# Patient Record
Sex: FEMALE | Race: OTHER | NOT HISPANIC OR LATINO | URBAN - METROPOLITAN AREA
[De-identification: names, ages, dates, MRNs, and addresses within clinical notes are randomized per-mention and may not be internally consistent; named-entity substitution may affect disease eponyms.]

---

## 2020-07-25 ENCOUNTER — TELEPHONE ENCOUNTER (OUTPATIENT)
Dept: URBAN - METROPOLITAN AREA CLINIC 13 | Facility: CLINIC | Age: 25
End: 2020-07-25

## 2020-07-26 ENCOUNTER — TELEPHONE ENCOUNTER (OUTPATIENT)
Dept: URBAN - METROPOLITAN AREA CLINIC 13 | Facility: CLINIC | Age: 25
End: 2020-07-26

## 2021-08-27 ENCOUNTER — OFFICE VISIT (OUTPATIENT)
Dept: URBAN - METROPOLITAN AREA TELEHEALTH 2 | Facility: TELEHEALTH | Age: 26
End: 2021-08-27
Payer: COMMERCIAL

## 2021-08-27 ENCOUNTER — LAB OUTSIDE AN ENCOUNTER (OUTPATIENT)
Dept: URBAN - METROPOLITAN AREA CLINIC 113 | Facility: CLINIC | Age: 26
End: 2021-08-27

## 2021-08-27 ENCOUNTER — DASHBOARD ENCOUNTERS (OUTPATIENT)
Age: 26
End: 2021-08-27

## 2021-08-27 VITALS — BODY MASS INDEX: 21.68 KG/M2 | HEIGHT: 64 IN | WEIGHT: 127 LBS

## 2021-08-27 DIAGNOSIS — K21.9 GERD WITHOUT ESOPHAGITIS: ICD-10-CM

## 2021-08-27 DIAGNOSIS — K31.84 GASTROPARESIS: ICD-10-CM

## 2021-08-27 DIAGNOSIS — R11.14 BILIOUS VOMITING WITH NAUSEA: ICD-10-CM

## 2021-08-27 DIAGNOSIS — R10.13 EPIGASTRIC ABDOMINAL PAIN: ICD-10-CM

## 2021-08-27 PROBLEM — 368581000119106: Status: ACTIVE | Noted: 2021-08-27

## 2021-08-27 PROBLEM — 302870006: Status: ACTIVE | Noted: 2021-08-27

## 2021-08-27 PROBLEM — 300331000: Status: ACTIVE | Noted: 2021-08-27

## 2021-08-27 PROBLEM — 428882003: Status: ACTIVE | Noted: 2021-08-27

## 2021-08-27 PROBLEM — 235675006: Status: ACTIVE | Noted: 2021-08-27

## 2021-08-27 PROCEDURE — 99245 OFF/OP CONSLTJ NEW/EST HI 55: CPT | Performed by: INTERNAL MEDICINE

## 2021-08-27 PROCEDURE — 99205 OFFICE O/P NEW HI 60 MIN: CPT | Performed by: INTERNAL MEDICINE

## 2021-08-27 RX ORDER — NIFEDIPINE 30 MG/1
1 TABLET TABLET, EXTENDED RELEASE ORAL ONCE A DAY
Status: ACTIVE | COMMUNITY

## 2021-08-27 RX ORDER — INSULIN GLARGINE 100 [IU]/ML
45 UNITS INJECTION, SOLUTION SUBCUTANEOUS NIGHTLY
Status: ACTIVE | COMMUNITY

## 2021-08-27 RX ORDER — LABETALOL HYDROCHLORIDE 200 MG/1
1 TABLET TABLET ORAL TWICE A DAY
Status: ACTIVE | COMMUNITY

## 2021-08-27 RX ORDER — FLUCONAZOLE 150 MG/1
1 TABLET TABLET ORAL ONCE DAILY
Status: ACTIVE | COMMUNITY

## 2021-08-27 RX ORDER — FAMOTIDINE 20 MG/1
1 TABLET AT BEDTIME TABLET, FILM COATED ORAL TWICE A DAY
Status: ACTIVE | COMMUNITY

## 2021-08-27 RX ORDER — LANSOPRAZOLE 30 MG/1
1 TABLET TABLET, ORALLY DISINTEGRATING, DELAYED RELEASE ORAL ONCE A DAY
Qty: 90 | Refills: 3 | OUTPATIENT

## 2021-08-27 RX ORDER — PROMETHAZINE HYDROCHLORIDE 6.25 MG/5ML
10 ML AS NEEDED SYRUP ORAL
Status: ACTIVE | COMMUNITY

## 2021-08-27 RX ORDER — CYCLOBENZAPRINE HYDROCHLORIDE 10 MG/1
1 TABLET AT BEDTIME AS NEEDED TABLET, FILM COATED ORAL ONCE A DAY
Status: ACTIVE | COMMUNITY

## 2021-08-27 NOTE — HPI-TODAY'S VISIT:
This is a 26-year-old female referred by Dr. Italo Lutz, presenting for evaluation of upper abdominal pain. A copy of today's visit will be forwarded to the referring provider.  Labs 7/14/2021:WBC 6.9, hemoglobin 12.7, hematocrit 41.4, MCV 91, platelets 238, glucose 740, BUN 16, creatinine 0.78, sodium 125, potassium 4.7, T bili<0.2, , AST 24, ALT 26, cholesterol 190, triglycerides 781, HDL 25, , hemoglobin A1c > 15.5.  She had her first episode of pancreatitis at age 15. She was having frequent hospitalizations for pancreatitis, which continued until she had her gall bladder removed at age 17. She has frequent reflux, nausea and vomiting. Her PCP has given her promethazine as well as a pain medication. She tells me that she had a gastric emptying test at [facility]. She was told she had delayed gastric emptying. She complains of chronic epigastric burning pain. She is taking famotidine 20 mg, two tablets once daily. Emesis is a mixture of bilious fluid and food contents. No bloody emesis or coffee ground looking emesis. Vomiting does not alleviate her upper abdominal pain. She has bowel movements daily without blood per rectum or melena.  She tells me that she has resumed Dexcom for glucose management, and blood glucose is running in the 250s. She is home today waiting for her Omnipod to arrive.  Gastric emptying 8/23/21: delayed emptying  CT a/p without contrast May 2021. hypodensity of the liver, poorly defined. MRI recommended to better characterize.

## 2021-09-01 PROBLEM — 266435005: Status: ACTIVE | Noted: 2021-08-27

## 2021-09-09 ENCOUNTER — OFFICE VISIT (OUTPATIENT)
Dept: URBAN - METROPOLITAN AREA SURGERY CENTER 25 | Facility: SURGERY CENTER | Age: 26
End: 2021-09-09

## 2021-10-04 ENCOUNTER — OFFICE VISIT (OUTPATIENT)
Dept: URBAN - METROPOLITAN AREA SURGERY CENTER 25 | Facility: SURGERY CENTER | Age: 26
End: 2021-10-04

## 2021-10-04 ENCOUNTER — TELEPHONE ENCOUNTER (OUTPATIENT)
Dept: URBAN - METROPOLITAN AREA CLINIC 112 | Facility: CLINIC | Age: 26
End: 2021-10-04

## 2021-10-04 RX ORDER — FLUCONAZOLE 150 MG/1
1 TABLET TABLET ORAL ONCE DAILY
Status: ACTIVE | COMMUNITY

## 2021-10-04 RX ORDER — INSULIN GLARGINE 100 [IU]/ML
45 UNITS INJECTION, SOLUTION SUBCUTANEOUS NIGHTLY
Status: ACTIVE | COMMUNITY

## 2021-10-04 RX ORDER — LABETALOL HYDROCHLORIDE 200 MG/1
1 TABLET TABLET ORAL TWICE A DAY
Status: ACTIVE | COMMUNITY

## 2021-10-04 RX ORDER — LANSOPRAZOLE 30 MG/1
1 TABLET TABLET, ORALLY DISINTEGRATING, DELAYED RELEASE ORAL ONCE A DAY
Qty: 90 | Refills: 3 | Status: ACTIVE | COMMUNITY

## 2021-10-04 RX ORDER — PROMETHAZINE HYDROCHLORIDE 6.25 MG/5ML
10 ML AS NEEDED SYRUP ORAL
Status: ACTIVE | COMMUNITY

## 2021-10-04 RX ORDER — CYCLOBENZAPRINE HYDROCHLORIDE 10 MG/1
1 TABLET AT BEDTIME AS NEEDED TABLET, FILM COATED ORAL ONCE A DAY
Status: ACTIVE | COMMUNITY

## 2021-10-04 RX ORDER — NIFEDIPINE 30 MG/1
1 TABLET TABLET, EXTENDED RELEASE ORAL ONCE A DAY
Status: ACTIVE | COMMUNITY

## 2021-10-04 RX ORDER — FAMOTIDINE 20 MG/1
1 TABLET AT BEDTIME TABLET, FILM COATED ORAL TWICE A DAY
Status: ACTIVE | COMMUNITY

## 2021-11-12 ENCOUNTER — OFFICE VISIT (OUTPATIENT)
Dept: URBAN - METROPOLITAN AREA CLINIC 113 | Facility: CLINIC | Age: 26
End: 2021-11-12

## 2021-11-30 ENCOUNTER — OFFICE VISIT (OUTPATIENT)
Dept: URBAN - METROPOLITAN AREA CLINIC 113 | Facility: CLINIC | Age: 26
End: 2021-11-30

## 2021-11-30 RX ORDER — FAMOTIDINE 20 MG/1
1 TABLET AT BEDTIME TABLET, FILM COATED ORAL TWICE A DAY
Status: ACTIVE | COMMUNITY

## 2021-11-30 RX ORDER — NIFEDIPINE 30 MG/1
1 TABLET TABLET, EXTENDED RELEASE ORAL ONCE A DAY
Status: ACTIVE | COMMUNITY

## 2021-11-30 RX ORDER — LABETALOL HYDROCHLORIDE 200 MG/1
1 TABLET TABLET ORAL TWICE A DAY
Status: ACTIVE | COMMUNITY

## 2021-11-30 RX ORDER — INSULIN GLARGINE 100 [IU]/ML
45 UNITS INJECTION, SOLUTION SUBCUTANEOUS NIGHTLY
Status: ACTIVE | COMMUNITY

## 2021-11-30 RX ORDER — PROMETHAZINE HYDROCHLORIDE 6.25 MG/5ML
10 ML AS NEEDED SYRUP ORAL
Status: ACTIVE | COMMUNITY

## 2021-11-30 RX ORDER — LANSOPRAZOLE 30 MG/1
1 TABLET TABLET, ORALLY DISINTEGRATING, DELAYED RELEASE ORAL ONCE A DAY
Qty: 90 | Refills: 3 | Status: ACTIVE | COMMUNITY

## 2021-11-30 RX ORDER — FLUCONAZOLE 150 MG/1
1 TABLET TABLET ORAL ONCE DAILY
Status: ACTIVE | COMMUNITY

## 2021-11-30 RX ORDER — CYCLOBENZAPRINE HYDROCHLORIDE 10 MG/1
1 TABLET AT BEDTIME AS NEEDED TABLET, FILM COATED ORAL ONCE A DAY
Status: ACTIVE | COMMUNITY

## 2022-07-02 ENCOUNTER — CLAIMS CREATED FROM THE CLAIM WINDOW (OUTPATIENT)
Dept: URBAN - METROPOLITAN AREA MEDICAL CENTER 2 | Facility: MEDICAL CENTER | Age: 27
End: 2022-07-02
Payer: COMMERCIAL

## 2022-07-02 DIAGNOSIS — E11.43 TYPE 2 DIABETES MELLITUS WITH DIABETIC AUTONOMIC (POLY)NEUROPATHY: ICD-10-CM

## 2022-07-02 DIAGNOSIS — K76.89 LESION OF LIVER: ICD-10-CM

## 2022-07-02 DIAGNOSIS — R10.13 ABDOMINAL PAIN AND WEIGHT LOSS R/O MESENTERIC ISCHEMIA: ICD-10-CM

## 2022-07-02 DIAGNOSIS — R11.0 AM NAUSEA: ICD-10-CM

## 2022-07-02 DIAGNOSIS — R74.8 ABNORMAL LEVELS OF OTHER SERUM ENZYMES: ICD-10-CM

## 2022-07-02 DIAGNOSIS — R74.01 ABNORMAL/ELEVATED TRANSAMINASE (SGOT, AMINOTRANSFERASE): ICD-10-CM

## 2022-07-02 PROCEDURE — 99254 IP/OBS CNSLTJ NEW/EST MOD 60: CPT | Performed by: INTERNAL MEDICINE

## 2022-07-03 ENCOUNTER — CLAIMS CREATED FROM THE CLAIM WINDOW (OUTPATIENT)
Dept: URBAN - METROPOLITAN AREA MEDICAL CENTER 2 | Facility: MEDICAL CENTER | Age: 27
End: 2022-07-03
Payer: COMMERCIAL

## 2022-07-03 DIAGNOSIS — E11.43 TYPE 2 DIABETES MELLITUS WITH DIABETIC AUTONOMIC (POLY)NEUROPATHY: ICD-10-CM

## 2022-07-03 DIAGNOSIS — R74.01 ABNORMAL/ELEVATED TRANSAMINASE (SGOT, AMINOTRANSFERASE): ICD-10-CM

## 2022-07-03 DIAGNOSIS — R10.13 ABDOMINAL DISCOMFORT, EPIGASTRIC: ICD-10-CM

## 2022-07-03 DIAGNOSIS — K76.89 LESION OF LIVER: ICD-10-CM

## 2022-07-03 DIAGNOSIS — R74.8 ABNORMAL LEVELS OF OTHER SERUM ENZYMES: ICD-10-CM

## 2022-07-03 PROCEDURE — 99232 SBSQ HOSP IP/OBS MODERATE 35: CPT | Performed by: INTERNAL MEDICINE

## 2022-07-04 ENCOUNTER — CLAIMS CREATED FROM THE CLAIM WINDOW (OUTPATIENT)
Dept: URBAN - METROPOLITAN AREA MEDICAL CENTER 2 | Facility: MEDICAL CENTER | Age: 27
End: 2022-07-04
Payer: COMMERCIAL

## 2022-07-04 DIAGNOSIS — R74.01 ABNORMAL/ELEVATED TRANSAMINASE (SGOT, AMINOTRANSFERASE): ICD-10-CM

## 2022-07-04 DIAGNOSIS — R74.8 ABNORMAL LEVELS OF OTHER SERUM ENZYMES: ICD-10-CM

## 2022-07-04 DIAGNOSIS — R93.5 ABDOMINAL ULTRASOUND, ABNORMAL: ICD-10-CM

## 2022-07-04 DIAGNOSIS — E11.43 TYPE 2 DIABETES MELLITUS WITH DIABETIC AUTONOMIC (POLY)NEUROPATHY: ICD-10-CM

## 2022-07-04 DIAGNOSIS — R11.0 AM NAUSEA: ICD-10-CM

## 2022-07-04 PROCEDURE — 99232 SBSQ HOSP IP/OBS MODERATE 35: CPT | Performed by: INTERNAL MEDICINE

## 2022-07-14 PROBLEM — 300331000 LESION OF LIVER: Status: ACTIVE | Noted: 2022-07-14

## 2022-07-20 ENCOUNTER — TELEPHONE ENCOUNTER (OUTPATIENT)
Dept: URBAN - METROPOLITAN AREA CLINIC 113 | Facility: CLINIC | Age: 27
End: 2022-07-20

## 2023-03-03 ENCOUNTER — TELEPHONE ENCOUNTER (OUTPATIENT)
Dept: URBAN - METROPOLITAN AREA CLINIC 113 | Facility: CLINIC | Age: 28
End: 2023-03-03

## 2023-03-08 ENCOUNTER — CLAIMS CREATED FROM THE CLAIM WINDOW (OUTPATIENT)
Dept: URBAN - METROPOLITAN AREA MEDICAL CENTER 2 | Facility: MEDICAL CENTER | Age: 28
End: 2023-03-08
Payer: COMMERCIAL

## 2023-03-08 DIAGNOSIS — K31.84 GASTROPARESIS: ICD-10-CM

## 2023-03-08 DIAGNOSIS — R10.84 ABDOMINAL CRAMPING, GENERALIZED: ICD-10-CM

## 2023-03-08 DIAGNOSIS — K21.9 GASTRO-ESOPHAGEAL REFLUX DISEASE WITHOUT ESOPHAGITIS: ICD-10-CM

## 2023-03-08 DIAGNOSIS — E10.43 GASTROPARESIS DUE TO TYPE 1 DIABETES MELLITUS: ICD-10-CM

## 2023-03-08 DIAGNOSIS — D72.829 LEUKOCYTOSIS: ICD-10-CM

## 2023-03-08 DIAGNOSIS — R74.8 ABNORMAL LEVELS OF OTHER SERUM ENZYMES: ICD-10-CM

## 2023-03-08 DIAGNOSIS — Z87.448 PERSONAL HISTORY OF OTHER DISEASES OF URINARY SYSTEM: ICD-10-CM

## 2023-03-08 DIAGNOSIS — Z34.91 PREGNANT AND NOT YET DELIVERED IN FIRST TRIMESTER: ICD-10-CM

## 2023-03-08 DIAGNOSIS — K76.0 NAFLD (NONALCOHOLIC FATTY LIVER DISEASE): ICD-10-CM

## 2023-03-08 PROCEDURE — 99222 1ST HOSP IP/OBS MODERATE 55: CPT | Performed by: INTERNAL MEDICINE

## 2023-03-08 PROCEDURE — 99222 1ST HOSP IP/OBS MODERATE 55: CPT | Performed by: PHYSICIAN ASSISTANT

## 2023-03-09 ENCOUNTER — CLAIMS CREATED FROM THE CLAIM WINDOW (OUTPATIENT)
Dept: URBAN - METROPOLITAN AREA MEDICAL CENTER 2 | Facility: MEDICAL CENTER | Age: 28
End: 2023-03-09
Payer: COMMERCIAL

## 2023-03-09 DIAGNOSIS — R74.01 ELEVATED AST (SGOT): ICD-10-CM

## 2023-03-09 DIAGNOSIS — E10.43 GASTROPARESIS DUE TO TYPE 1 DIABETES MELLITUS: ICD-10-CM

## 2023-03-09 DIAGNOSIS — Z34.90 INTRAUTERINE PREGNANCY: ICD-10-CM

## 2023-03-09 DIAGNOSIS — R10.814 LLQ ABDOMINAL TENDERNESS: ICD-10-CM

## 2023-03-09 DIAGNOSIS — K76.0 NAFLD (NONALCOHOLIC FATTY LIVER DISEASE): ICD-10-CM

## 2023-03-09 DIAGNOSIS — R74.8 ABNORMAL LEVELS OF OTHER SERUM ENZYMES: ICD-10-CM

## 2023-03-09 PROCEDURE — 99232 SBSQ HOSP IP/OBS MODERATE 35: CPT | Performed by: PHYSICIAN ASSISTANT

## 2023-03-10 ENCOUNTER — CLAIMS CREATED FROM THE CLAIM WINDOW (OUTPATIENT)
Dept: URBAN - METROPOLITAN AREA MEDICAL CENTER 2 | Facility: MEDICAL CENTER | Age: 28
End: 2023-03-10
Payer: COMMERCIAL

## 2023-03-10 DIAGNOSIS — R74.01 ABNORMAL/ELEVATED TRANSAMINASE (SGOT, AMINOTRANSFERASE): ICD-10-CM

## 2023-03-10 DIAGNOSIS — R74.8 ABNORMAL LEVELS OF OTHER SERUM ENZYMES: ICD-10-CM

## 2023-03-10 DIAGNOSIS — K76.0 NAFLD (NONALCOHOLIC FATTY LIVER DISEASE): ICD-10-CM

## 2023-03-10 DIAGNOSIS — E10.43 GASTROPARESIS DUE TO TYPE 1 DIABETES MELLITUS: ICD-10-CM

## 2023-03-10 DIAGNOSIS — R79.82 ELEVATED C-REACTIVE PROTEIN (CRP): ICD-10-CM

## 2023-03-10 PROCEDURE — 99232 SBSQ HOSP IP/OBS MODERATE 35: CPT | Performed by: PHYSICIAN ASSISTANT

## 2023-03-16 PROBLEM — 713702000: Status: ACTIVE | Noted: 2023-03-16

## 2023-03-16 PROBLEM — 197315008 NAFLD - NONALCOHOLIC FATTY LIVER DISEASE: Status: ACTIVE | Noted: 2023-03-16

## 2023-05-04 ENCOUNTER — OFFICE VISIT (OUTPATIENT)
Dept: URBAN - METROPOLITAN AREA CLINIC 113 | Facility: CLINIC | Age: 28
End: 2023-05-04